# Patient Record
(demographics unavailable — no encounter records)

---

## 2025-02-27 NOTE — REVIEW OF SYSTEMS
[Fatigue] : fatigue [SOB on Exertion] : sob on exertion [Thyroid Problem] : thyroid problem [Obesity] : obesity [Negative] : Psychiatric

## 2025-02-27 NOTE — PHYSICAL EXAM
[No Acute Distress] : no acute distress [Low Lying Soft Palate] : low lying soft palate [Elongated Uvula] : elongated uvula [Enlarged Base of the Tongue] : enlarged base of the tongue [III] : Mallampati Class: III [Normal Appearance] : normal appearance [Supple] : supple [Normal Rate/Rhythm] : normal rate/rhythm [Normal S1, S2] : normal s1, s2 [No Murmurs] : no murmurs [No Resp Distress] : no resp distress [No Acc Muscle Use] : no acc muscle use [Normal Rhythm and Effort] : normal rhythm and effort [Clear to Auscultation Bilaterally] : clear to auscultation bilaterally [No Abnormalities] : no abnormalities [Benign] : benign [Not Tender] : not tender [Soft] : soft [No Clubbing] : no clubbing [No Edema] : no edema [No Focal Deficits] : no focal deficits [Oriented x3] : oriented x3 [TextBox_11] : moderate oropharyngeal crowding.

## 2025-02-27 NOTE — CONSULT LETTER
[Dear  ___] : Dear  [unfilled], [Consult Letter:] : I had the pleasure of evaluating your patient, [unfilled]. [Please see my note below.] : Please see my note below. [Consult Closing:] : Thank you very much for allowing me to participate in the care of this patient.  If you have any questions, please do not hesitate to contact me. [Sincerely,] : Sincerely, [FreeTextEntry3] : Luther Kruse MD, FCCP, D. [facility] [place]

## 2025-02-27 NOTE — DISCUSSION/SUMMARY
[FreeTextEntry1] : #1. Spirometry performed previously was essentially normal #2. SOBOE is likely related to weight or deconditioning given normal spirometry #3. Diet and exercise for weight loss #4. The patient does not appear to require chronic BD therapy at this time #5. APAP at 5-10 cm of water to treat mild GENA but will repeat HST. #6. Replace PAP equipment as needed; ordered machine and supplies 2/27/25. #7. CXR to evaluate SOBOE. #8. S/p Covid vaccines. #9. F/u in 2 months with compliance, HST, CXR and jung.

## 2025-02-27 NOTE — RESULTS/DATA
[TextEntry] : S/N PSG from 12/19/18 revealed mild GENA with an AHI of 13.4 and an optimal pressure of 7 cm of water  Compliance is 100% with a residual AHI of 0.9 which was normal.

## 2025-02-27 NOTE — END OF VISIT
[Time Spent: ___ minutes] : I have spent [unfilled] minutes of time on the encounter which excludes teaching and separately reported services. [TextEntry] : Discussed with pt at length regarding GENA, obesity, soboe, secondhand smoke exposure; reviewed w/u with pt as above.

## 2025-04-30 NOTE — HISTORY OF PRESENT ILLNESS
[Pain Location] : pain [Stable] : stable [8] : a current pain level of 8/10 [Hip Movement] : worsened by hip movement [Walking] : worsened by walking [Rest] : relieved by rest [de-identified] : This is a 68-year-old female presented with persistent right hip pain that is severe in past 3 months. She did have a sudden onset of pain without trauma. She had evaluation with another orthopedist who she had some intra-articular cortisone injection in March, and she feels no relief. She is taking diclofenac which bothers her stomach without any relief. She underwent right hip MRI done on April 24, 2025. She is walking with a cane. She has significant pain with any type of hip movement. She has difficulty walking and navigating steps and stairs. She is looking for definitive treatment.

## 2025-04-30 NOTE — END OF VISIT
[FreeTextEntry3] : Documented by Agnes Easton acting solely as a scribe for Dr. Wilfredo Patel on this date 04/30/2025.   All Medical record entries made by the Scribe were at my, Dr. Wilfredo Patel's, direction and personally dictated by me on 04/30/2025. I have reviewed the chart and agree that the record accurately reflects my personal performance of the history, physical exam, assessment and plan. I have also personally directed, reviewed, and agreed with the discharge instructions.

## 2025-04-30 NOTE — PHYSICAL EXAM
[Cane] : ambulates with cane [de-identified] : GENERAL APPEARANCE: Well nourished and hydrated, pleasant, alert, and oriented x 3. Appears their stated age.  HEENT: Normocephalic, extraocular eye motion intact. Nasal septum midline. Oral cavity clear. External auditory canal clear.  RESPIRATORY: Breath sounds clear and audible in all lobes. No wheezing, No accessory muscle use. CARDIOVASCULAR: No apparent abnormalities. No lower leg edema. No varicosities. Pedal pulses are palpable. NEUROLOGIC: Sensation is normal, no muscle weakness in the upper or lower extremities. DERMATOLOGIC: No apparent skin lesions, moist, warm, no rash. SPINE: Cervical spine appears normal and moves freely; thoracic spine appears normal and moves freely; lumbosacral spine appears normal and moves freely, normal, nontender. MUSCULOSKELETAL: Hands, wrists, and elbows are normal and move freely, shoulders are normal and move freely.  Musculoskeletal 5/5 motor strength in bilateral lower extremities. Sensory: Intact in bilateral lower extremities. DTRs: Biceps, brachioradialis, triceps, patellar, ankle and plantar 2+ and symmetric bilaterally. Pulses: dorsalis pedis, posterior tibial, femoral, popliteal, and radial 2+ and symmetric bilaterally.  Constitutional: Alert and in no acute distress, but well-appearing.   [de-identified] : Right hip examination shows significant groin pain with gentle logroll of the right hip she has difficulty standing up and walking with severe antalgic gait using a cane [de-identified] : 3 view right hip x-ray shows bone-on-bone osteoarthritis of the right hip with significant subchondral sclerosis

## 2025-04-30 NOTE — DISCUSSION/SUMMARY
[Medication Risks Reviewed] : Medication risks reviewed [Surgical risks reviewed] : Surgical risks reviewed [de-identified] : This is a 68 year old F pt presents today with bone-on-bone right hip osteoarthritis. The nature of condition and treatment options were discussed in detail. Patient is a candidate for right CHRIST. The surgery was discussed in detail including pre-op and post-op. The pt is having worse pain with walking, stairs, and any exercise with hip movement. The pain is limiting her quality of life. The pt has exhausted over 3 months of conservative treatment including IA cortisone injections (last injection received on 3/13/25), home therapy, PT, anti-inflammatories, Tylenol. I believe right CHRIST is reasonable.  Pt understands that they cannot have surgery within 3 months of receiving an IA injection. The pt will talk with the surgical coordinator to schedule a right CHRIST. All questions and concerns were answered.  The patient has been counseled regarding the elevated risks associated with surgical complications in patients with a BMI> 35. The patient demonstrates an understanding of the increased risk. After a lengthy discussion, the patient agreed to make a coordinated effort at weight loss prior to surgical intervention. The patient understands our BMI policy and they will make a conscious effort to improve their BMI.  The patient is a 68 year old individual with end stage arthritis of their right hip joint. The patient has exhausted a minimum of 3 months conservative treatment including prior injections, physical therapy, over the counter NSAIDS and pain medication where indicated. In addition, the patient's quality of life is diminished due to significant chronic pain. The patient is having difficulty with activities of daily living, including ambulating, descending stairs, and rising from a seated position. Based upon the patients continued symptoms and failure to respond to conservative treatment, I have recommended a right total hip replacement for this patient. A long discussion took place with the patient describing what a total joint replacement is and what the procedure would entail. A hip model, similar to the implant that will be used during the operation, was utilized to demonstrate and to discuss the various bearing surfaces of the implants. Implant fixation, press fit vs cemented, was also discussed with the patient. Bearing surfaces, including ceramic-on highly cross-linked polyethylene and metal on highly cross-linked polyethylene were discussed with the patient. Choices of implant manufacturers, mainly [employer] and [employer], were discussed and reviewed with preference to be made by patient and surgeon prior to operation. Final selection to be based on customary practice as well as preoperative templating with selection confirmed intraoperatively based on the patient's anatomy. The patient participated and agreed with the decision-making process. The hospitalization and post-operative care and rehabilitation were also discussed. The use of perioperative antibiotics and DVT prophylaxis were discussed. The risk, benefits and alternatives to surgical interventions were discussed at length with the patient. The patient was also advised of risks related to the medical comorbidities and elevated body mass index (BMI). A lengthy discussion took place to review the most common complications including but not limited to: deep vein thrombosis,pulmonary embolism, heart attack, stroke, infection, wound breakdown, numbness, damage to nerves, tendon, muscles, arteries or other blood vessels, death and other possible complications from anesthesia. The patient was told that we will take steps to minimize these risks by using sterile technique, antibiotics and DVT prophylaxis when appropriate and follow the patient postoperatively in the office setting to monitor progress. The possibility of recurrent pain, no improvement in pain and actual worsening of pain were also discussed with the patient.   The discharge plan of care focused on the patient going home following surgery. The patient was encouraged to make the necessary arrangements to have someone stay with them when they are discharged home. Following discharge, a home care therapist will visit the patient. The home care therapist will open your home care case and request home physical therapy services. Home physical therapy will commence following discharge provided it is appropriate and covered by the health insurance benefit plan.   The benefits of surgery were discussed with the patient including the potential for improving his/her current clinical condition through operative intervention. Alternatives to surgical intervention including continued conservative management were also discussed in detail. All questions were answered to the satisfaction of the patient. The treatment plan of care, as well as a model of a total hip equivalent to the one that will be used for their total joint replacement, was shared with the patient. The patient participated and agreed to the plan of care as well as the use of the recommended implants for their total hip replacement surgery.

## 2025-07-21 NOTE — PROCEDURE
[de-identified] :  I injected the patient's left knee euflexxa inj #1 Lot : [id number] Exp: 09/12/2026. Knee injection visco-supplementation: I discussed at length with the patient the planned steroid and lidocaine injection for primary osteoarthritis. The risks, benefits, convalescence and alternatives were reviewed and pt consented for injection. The possible side effects discussed included but were not limited to: pain, swelling, heat and redness. There symptoms are generally mild but if they are extensive then contact the office. Giving pain relievers by mouth such as NSAIDs or Tylenol can generally treat the reactions to injection. Rare cases of infection have been noted. Rash, hives and itching may occur post injection. If you have muscle pain or cramps, flushing and or swelling of the face, rapid heart beat, nausea, dizziness, fever, chills, headache, difficulty breathing, swelling in the arms or legs, or have a prickly feeling of your skin, contact a health care provider immediately. Following this discussion, the knee was prepped with alcohol and under sterile condition the injection was performed through a superolateral injection site with a 20 gauge needle. The needle was introduced into the joint, aspiration was performed to ensure intra-articular placement and the medication was injected. Upon withdrawal of the needle the site was cleaned with alcohol and a band aid applied. The patient tolerated the injection well and there were no adverse effects. Post injection instructions included no strenuous activity for 24 hours, cryotherapy and if there are any adverse effects to contact the office.

## 2025-07-21 NOTE — HISTORY OF PRESENT ILLNESS
[de-identified] : Patient is 68-year-old female comes in today for evaluation of left knee pain.She is status post right hip replacement May 27 and reports overall her right hip is doing very well.  She does have history of left knee osteoarthritis.  She was seeing a doctor in [place] prior to her hip replacement.She reports she had a cortisone injection which she had minimal relief from then had a single HA injection which she reports she did do well with 6 months ago.She has had a flareup of left knee pain since hip surgery as she feels she is putting extra pressure on her left side.  She reports mostly medial knee pain worse with ambulation stairs and exercise.No prior surgery on the left knee

## 2025-07-21 NOTE — HISTORY OF PRESENT ILLNESS
[de-identified] : Patient is 68-year-old female comes in today for evaluation of left knee pain.She is status post right hip replacement May 27 and reports overall her right hip is doing very well.  She does have history of left knee osteoarthritis.  She was seeing a doctor in [place] prior to her hip replacement.She reports she had a cortisone injection which she had minimal relief from then had a single HA injection which she reports she did do well with 6 months ago.She has had a flareup of left knee pain since hip surgery as she feels she is putting extra pressure on her left side.  She reports mostly medial knee pain worse with ambulation stairs and exercise.No prior surgery on the left knee

## 2025-07-21 NOTE — DISCUSSION/SUMMARY
[de-identified] : Patient is a 68-year-old femaleWith advanced left knee osteoarthritis. She is status post right hip replacement May 27, 2025 and is happy with her surgical outcome.  She is ultimately candidate for left knee replacement but would like to exhaust conservative treatment including gel injections cortisone injections anti-inflammatory medications and physical therapy.  Patient opted for left knee Euflexxa injection #1 in the office today which she tolerated well.  Follow-up 1 week injection #2  The patient has end stage arthritis of their left knee joint. The patient has exhausted a minimum of 3 months conservative treatment including prior injections (cortisone and/or hyaluronic acid injections), physical therapy, over the counter NSAIDS and pain medication where indicated. In addition, the patient's quality of life is diminished due to significant chronic pain. The patient is having difficulty with activities of daily living, including ambulating, descending stairs, and rising from a seated position. Based upon the patients continued symptoms and failure to respond to conservative treatment, I have recommended a (right/left) total knee replacement for this patient. A long discussion took place with the patient describing what a total joint replacement is and what the procedure would entail. A knee model, similar to the implant that will be used during the operation, was utilized to demonstrate and to discuss the various bearing surfaces of the implants. Implant fixation, use of cement, was also discussed with the patient. Choices of implant manufacturers, mainly [default value], were discussed and reviewed with preference to be made by patient and surgeon prior to operation. Final selection to be based on customary practice as well as preoperative templating with selection confirmed intraoperatively based on the patient's anatomy. The patient participated and agreed with the decision-making process. The hospitalization and post-operative care and rehabilitation were also discussed. The use of perioperative antibiotics and DVT prophylaxis were discussed. The risk, benefits and alternatives to a surgical intervention were discussed at length with the patient. The patient was also advised of risks related to the medical comorbidities and elevated body mass index (BMI). A lengthy discussion took place to review the most common complications including but not limited to: deep vein thrombosis, pulmonary embolism, heart attack, stroke, infection, wound breakdown, numbness, damage to nerves, tendon, muscles, arteries or other blood vessels, death and other possible complications from anesthesia. The patient was told that we will take steps to minimize these risks by using sterile technique, antibiotics and DVT prophylaxis when appropriate and follow the patient postoperatively in the office setting to monitor progress. The possibility of recurrent pain, no improvement in pain and actual worsening of pain were also discussed with the patient. The discharge plan of care focused on the patient going home following surgery. The patient was encouraged to make the necessary arrangements to have someone stay with them when they are discharged home. Following discharge, a home care physical therapist will visit the patient. The home care physical therapist will open your home care case and request home physical therapy services. Home physical therapy will commence following discharge provided it is appropriate and covered by the health insurance benefit plan. The benefits of surgery were discussed with the patient including the potential for improving his/her current clinical condition through operative intervention. Alternatives to surgical intervention including continued conservative management were also discussed in detail. All questions were answered to the satisfaction of the patient. The treatment plan of care, as well as a model of a total knee equivalent to the one that will be used for their total joint replacement, was shared with the patient. The patient participated and agreed to the plan of care as

## 2025-07-21 NOTE — PROCEDURE
[de-identified] :  I injected the patient's left knee euflexxa inj #1 Lot : [id number] Exp: 09/12/2026. Knee injection visco-supplementation: I discussed at length with the patient the planned steroid and lidocaine injection for primary osteoarthritis. The risks, benefits, convalescence and alternatives were reviewed and pt consented for injection. The possible side effects discussed included but were not limited to: pain, swelling, heat and redness. There symptoms are generally mild but if they are extensive then contact the office. Giving pain relievers by mouth such as NSAIDs or Tylenol can generally treat the reactions to injection. Rare cases of infection have been noted. Rash, hives and itching may occur post injection. If you have muscle pain or cramps, flushing and or swelling of the face, rapid heart beat, nausea, dizziness, fever, chills, headache, difficulty breathing, swelling in the arms or legs, or have a prickly feeling of your skin, contact a health care provider immediately. Following this discussion, the knee was prepped with alcohol and under sterile condition the injection was performed through a superolateral injection site with a 20 gauge needle. The needle was introduced into the joint, aspiration was performed to ensure intra-articular placement and the medication was injected. Upon withdrawal of the needle the site was cleaned with alcohol and a band aid applied. The patient tolerated the injection well and there were no adverse effects. Post injection instructions included no strenuous activity for 24 hours, cryotherapy and if there are any adverse effects to contact the office.

## 2025-07-21 NOTE — PHYSICAL EXAM
[de-identified] :  GENERAL APPEARANCE: Well nourished and hydrated, pleasant, alert, and oriented x 3. Appears their stated age.  HEENT: Normocephalic, extraocular eye motion intact. Nasal septum midline. Oral cavity clear. External auditory canal clear.  RESPIRATORY: Breath sounds clear and audible in all lobes. No wheezing, No accessory muscle use. CARDIOVASCULAR: No apparent abnormalities. No lower leg edema. No varicosities. Pedal pulses are palpable. NEUROLOGIC: Sensation is normal, no muscle weakness in the upper or lower extremities. DERMATOLOGIC: No apparent skin lesions, moist, warm, no rash. SPINE: Cervical spine appears normal and moves freely; thoracic spine appears normal and moves freely; lumbosacral spine appears normal and moves freely, normal, nontender. MUSCULOSKELETAL: Hands, wrists, and elbows are normal and move freely, shoulders are normal and move freely.        [de-identified] : Left knee exam range of motion 5/115 medial joint line tenderness mild effusion [de-identified] : 4 view imaging of the left knee shows bone-on-bone medial compartment osteoarthritis with varus alignment

## 2025-07-21 NOTE — DISCUSSION/SUMMARY
[de-identified] : Patient is a 68-year-old femaleWith advanced left knee osteoarthritis. She is status post right hip replacement May 27, 2025 and is happy with her surgical outcome.  She is ultimately candidate for left knee replacement but would like to exhaust conservative treatment including gel injections cortisone injections anti-inflammatory medications and physical therapy.  Patient opted for left knee Euflexxa injection #1 in the office today which she tolerated well.  Follow-up 1 week injection #2  The patient has end stage arthritis of their left knee joint. The patient has exhausted a minimum of 3 months conservative treatment including prior injections (cortisone and/or hyaluronic acid injections), physical therapy, over the counter NSAIDS and pain medication where indicated. In addition, the patient's quality of life is diminished due to significant chronic pain. The patient is having difficulty with activities of daily living, including ambulating, descending stairs, and rising from a seated position. Based upon the patients continued symptoms and failure to respond to conservative treatment, I have recommended a (right/left) total knee replacement for this patient. A long discussion took place with the patient describing what a total joint replacement is and what the procedure would entail. A knee model, similar to the implant that will be used during the operation, was utilized to demonstrate and to discuss the various bearing surfaces of the implants. Implant fixation, use of cement, was also discussed with the patient. Choices of implant manufacturers, mainly [default value], were discussed and reviewed with preference to be made by patient and surgeon prior to operation. Final selection to be based on customary practice as well as preoperative templating with selection confirmed intraoperatively based on the patient's anatomy. The patient participated and agreed with the decision-making process. The hospitalization and post-operative care and rehabilitation were also discussed. The use of perioperative antibiotics and DVT prophylaxis were discussed. The risk, benefits and alternatives to a surgical intervention were discussed at length with the patient. The patient was also advised of risks related to the medical comorbidities and elevated body mass index (BMI). A lengthy discussion took place to review the most common complications including but not limited to: deep vein thrombosis, pulmonary embolism, heart attack, stroke, infection, wound breakdown, numbness, damage to nerves, tendon, muscles, arteries or other blood vessels, death and other possible complications from anesthesia. The patient was told that we will take steps to minimize these risks by using sterile technique, antibiotics and DVT prophylaxis when appropriate and follow the patient postoperatively in the office setting to monitor progress. The possibility of recurrent pain, no improvement in pain and actual worsening of pain were also discussed with the patient. The discharge plan of care focused on the patient going home following surgery. The patient was encouraged to make the necessary arrangements to have someone stay with them when they are discharged home. Following discharge, a home care physical therapist will visit the patient. The home care physical therapist will open your home care case and request home physical therapy services. Home physical therapy will commence following discharge provided it is appropriate and covered by the health insurance benefit plan. The benefits of surgery were discussed with the patient including the potential for improving his/her current clinical condition through operative intervention. Alternatives to surgical intervention including continued conservative management were also discussed in detail. All questions were answered to the satisfaction of the patient. The treatment plan of care, as well as a model of a total knee equivalent to the one that will be used for their total joint replacement, was shared with the patient. The patient participated and agreed to the plan of care as

## 2025-07-21 NOTE — PHYSICAL EXAM
[de-identified] :  GENERAL APPEARANCE: Well nourished and hydrated, pleasant, alert, and oriented x 3. Appears their stated age.  HEENT: Normocephalic, extraocular eye motion intact. Nasal septum midline. Oral cavity clear. External auditory canal clear.  RESPIRATORY: Breath sounds clear and audible in all lobes. No wheezing, No accessory muscle use. CARDIOVASCULAR: No apparent abnormalities. No lower leg edema. No varicosities. Pedal pulses are palpable. NEUROLOGIC: Sensation is normal, no muscle weakness in the upper or lower extremities. DERMATOLOGIC: No apparent skin lesions, moist, warm, no rash. SPINE: Cervical spine appears normal and moves freely; thoracic spine appears normal and moves freely; lumbosacral spine appears normal and moves freely, normal, nontender. MUSCULOSKELETAL: Hands, wrists, and elbows are normal and move freely, shoulders are normal and move freely.        [de-identified] : Left knee exam range of motion 5/115 medial joint line tenderness mild effusion [de-identified] : 4 view imaging of the left knee shows bone-on-bone medial compartment osteoarthritis with varus alignment

## 2025-07-25 NOTE — DISCUSSION/SUMMARY
[de-identified] : Patient is a 68-year-old femaleWith advanced left knee osteoarthritis. She is status post right hip replacement May 27, 2025 and is happy with her surgical outcome. She is ultimately candidate for left knee replacement but would like to exhaust conservative treatment including gel injections cortisone injections anti-inflammatory medications and physical therapy. Patient opted for left knee Euflexxa injection #2 in the office today which she tolerated well. Follow-up 1 week injection #3  The patient has end stage arthritis of their left knee joint. The patient has exhausted a minimum of 3 months conservative treatment including prior injections (cortisone and/or hyaluronic acid injections), physical therapy, over the counter NSAIDS and pain medication where indicated. In addition, the patient's quality of life is diminished due to significant chronic pain. The patient is having difficulty with activities of daily living, including ambulating, descending stairs, and rising from a seated position. Based upon the patients continued symptoms and failure to respond to conservative treatment, I have recommended a (right/left) total knee replacement for this patient. A long discussion took place with the patient describing what a total joint replacement is and what the procedure would entail. A knee model, similar to the implant that will be used during the operation, was utilized to demonstrate and to discuss the various bearing surfaces of the implants. Implant fixation, use of cement, was also discussed with the patient. Choices of implant manufacturers, mainly [default value], were discussed and reviewed with preference to be made by patient and surgeon prior to operation. Final selection to be based on customary practice as well as preoperative templating with selection confirmed intraoperatively based on the patient's anatomy. The patient participated and agreed with the decision-making process. The hospitalization and post-operative care and rehabilitation were also discussed. The use of perioperative antibiotics and DVT prophylaxis were discussed. The risk, benefits and alternatives to a surgical intervention were discussed at length with the patient. The patient was also advised of risks related to the medical comorbidities and elevated body mass index (BMI). A lengthy discussion took place to review the most common complications including but not limited to: deep vein thrombosis, pulmonary embolism, heart attack, stroke, infection, wound breakdown, numbness, damage to nerves, tendon, muscles, arteries or other blood vessels, death and other possible complications from anesthesia. The patient was told that we will take steps to minimize these risks by using sterile technique, antibiotics and DVT prophylaxis when appropriate and follow the patient postoperatively in the office setting to monitor progress. The possibility of recurrent pain, no improvement in pain and actual worsening of pain were also discussed with the patient. The discharge plan of care focused on the patient going home following surgery. The patient was encouraged to make the necessary arrangements to have someone stay with them when they are discharged home. Following discharge, a home care physical therapist will visit the patient. The home care physical therapist will open your home care case and request home physical therapy services. Home physical therapy will commence following discharge provided it is appropriate and covered by the health insurance benefit plan. The benefits of surgery were discussed with the patient including the potential for improving his/her current clinical condition through operative intervention. Alternatives to surgical intervention including continued conservative management were also discussed in detail. All questions were answered to the satisfaction of the patient. The treatment plan of care, as well as a model of a total knee equivalent to the one that will be used for their total joint replacement, was shared with the patient. The patient participated and agreed to the plan of care as.

## 2025-07-25 NOTE — PROCEDURE
[de-identified] :  I injected the patient's left knee euflexxa inj #2 Lot : [id number] Exp: 09/12/2026.   Knee injection visco-supplementation: I discussed at length with the patient the planned steroid and lidocaine injection for primary osteoarthritis. The risks, benefits, convalescence and alternatives were reviewed and pt consented for injection. The possible side effects discussed included but were not limited to: pain, swelling, heat and redness. There symptoms are generally mild but if they are extensive then contact the office. Giving pain relievers by mouth such as NSAIDs or Tylenol can generally treat the reactions to injection. Rare cases of infection have been noted. Rash, hives and itching may occur post injection. If you have muscle pain or cramps, flushing and or swelling of the face, rapid heart beat, nausea, dizziness, fever, chills, headache, difficulty breathing, swelling in the arms or legs, or have a prickly feeling of your skin, contact a health care provider immediately. Following this discussion, the knee was prepped with alcohol and under sterile condition the injection was performed through a superolateral injection site with a 20 gauge needle. The needle was introduced into the joint, aspiration was performed to ensure intra-articular placement and the medication was injected. Upon withdrawal of the needle the site was cleaned with alcohol and a band aid applied. The patient tolerated the injection well and there were no adverse effects. Post injection instructions included no strenuous activity for 24 hours, cryotherapy and if there are any adverse effects to contact the office.